# Patient Record
Sex: FEMALE | Race: WHITE | NOT HISPANIC OR LATINO | URBAN - METROPOLITAN AREA
[De-identification: names, ages, dates, MRNs, and addresses within clinical notes are randomized per-mention and may not be internally consistent; named-entity substitution may affect disease eponyms.]

---

## 2017-09-05 ENCOUNTER — IMPORTED ENCOUNTER (OUTPATIENT)
Dept: URBAN - METROPOLITAN AREA CLINIC 43 | Facility: CLINIC | Age: 67
End: 2017-09-05

## 2017-09-05 PROBLEM — H25.13: Noted: 2017-09-05

## 2017-09-05 PROBLEM — H25.013: Noted: 2017-09-05

## 2017-09-05 PROBLEM — H25.041: Noted: 2017-09-05

## 2017-09-21 NOTE — PATIENT DISCUSSION
Patient educated at length on Symfony lens.  Patient educated on cataract sx and corneal edema.  Patient reassured va is improving and edema is resolving.

## 2017-10-26 NOTE — PATIENT DISCUSSION
Patient informed that she is doing great visually but is having difficulty adapting to Symfony.   Patient educated on Symfony vs. monofocal IOL.    Patient educated on possible need for Yag in future.  Patient educated on IOL exchange to monofocal IOL.   Recommend to keep adapting to [facility] and in time it will become more natural.

## 2018-04-06 NOTE — PATIENT DISCUSSION
Patient States that she doesnt wear glasses anymore and that her vision is better than prior to surgery OD.

## 2018-04-06 NOTE — PATIENT DISCUSSION
Patient informed to wait for symptoms to increase because she may not notice a change in vision after yag due to small pco.

## 2018-04-06 NOTE — PATIENT DISCUSSION
Patient informed that she is doing great visually but is having difficulty adapting to Symfony.   Patient educated on Symfony vs. monofocal IOL.    Patient educated on possible need for Yag in future.  Patient educated on IOL exchange to monofocal IOL.   Recommend to keep adapting to [facility] and in time it will become more natural.

## 2018-08-09 NOTE — PATIENT DISCUSSION
Consider TMF +3.25 if patient wants more near vision when ready for OS.  She may have trouble with distance vision with Symfony goal -0.75.

## 2018-08-09 NOTE — PATIENT DISCUSSION
Patient was informed on distance vision of monofocal vs Symfony iol.  Patient was educated on IOL exchange to monofocal IOL but she would lose near vision.

## 2018-08-29 NOTE — PROCEDURE NOTE: SURGICAL
<p>Prior to commencing surgery patient identification, surgical procedure, site, and side were confirmed by Dr. Concha Adair. Following topical proparacaine anesthesia, the patient was positioned at the YAG laser, a contact lens coupled to the cornea with methylcellulose and an axial posterior capsulotomy performed without complication using 2.7 Mj x 25. Excess methylcellulose was washed from the eye, one drop of Alphagan was instilled and the patient returned to the holding area having tolerated the procedure well and without complication. </p><p>MRN:[id number]</p>

## 2018-09-06 NOTE — PATIENT DISCUSSION
Consider TMF +3.25 if patient wants more near vision when ready for OS and because she may have trouble with distance vision with Symfony goal -0.75 OS.  Patient informed on Symfony with goal of Jen if she wants better distance vision but she will lose near and may need reading glasses for small near activities.

## 2018-09-06 NOTE — PATIENT DISCUSSION
Patient was informed she was doing really well with a nice balance between distance and near vision OD.  If we increase distance vision in OD she will lose near vision.

## 2018-09-06 NOTE — PATIENT DISCUSSION
Patient would like to trial current vision and decide on options.  She would like to discuss with [facility] options for OS.

## 2019-09-18 NOTE — PATIENT DISCUSSION
Patient informed that prior to cataract surgery os her os was all astigmatism which gave her blurry near and distance vision.  At this time she is mostly nearsighted and that is why her distance vision is blurry.

## 2020-04-19 ASSESSMENT — TONOMETRY
OS_IOP_MMHG: 12.0
OD_IOP_MMHG: 13.0

## 2020-04-19 ASSESSMENT — VISUAL ACUITY
OS_SC: 20/50
OD_CC: J1
OS_CC: 20/30 -2
OS_CC: J1
OD_SC: 20/200
OS_OTHER: 20/200.
OD_CC: 20/70
OD_OTHER: 20/400.

## 2020-04-19 ASSESSMENT — KERATOMETRY
OD_K1POWER_DIOPTERS: 44
OD_AXISANGLE2_DEGREES: 110
OD_AXISANGLE_DEGREES: 20
OS_K1POWER_DIOPTERS: 44.5
OS_K2POWER_DIOPTERS: 44.75
OD_K2POWER_DIOPTERS: 44.75
OS_AXISANGLE_DEGREES: 145
OS_AXISANGLE2_DEGREES: 55